# Patient Record
Sex: FEMALE | Race: WHITE | NOT HISPANIC OR LATINO | Employment: OTHER | URBAN - METROPOLITAN AREA
[De-identification: names, ages, dates, MRNs, and addresses within clinical notes are randomized per-mention and may not be internally consistent; named-entity substitution may affect disease eponyms.]

---

## 2023-05-02 ENCOUNTER — TELEPHONE (OUTPATIENT)
Dept: PRIMARY CARE | Facility: CLINIC | Age: 73
End: 2023-05-02
Payer: MEDICARE

## 2023-05-02 DIAGNOSIS — K21.9 GERD WITHOUT ESOPHAGITIS: Primary | ICD-10-CM

## 2023-05-02 DIAGNOSIS — E78.5 HYPERLIPIDEMIA, UNSPECIFIED HYPERLIPIDEMIA TYPE: Primary | ICD-10-CM

## 2023-05-02 RX ORDER — ROSUVASTATIN CALCIUM 20 MG/1
20 TABLET, COATED ORAL DAILY
COMMUNITY
End: 2023-05-02 | Stop reason: SDUPTHER

## 2023-05-02 RX ORDER — ROSUVASTATIN CALCIUM 20 MG/1
20 TABLET, COATED ORAL DAILY
Qty: 90 TABLET | Refills: 3 | Status: SHIPPED | OUTPATIENT
Start: 2023-05-02

## 2023-05-02 RX ORDER — FAMOTIDINE 40 MG/1
40 TABLET, FILM COATED ORAL DAILY
Qty: 90 TABLET | Refills: 1 | Status: SHIPPED | OUTPATIENT
Start: 2023-05-02 | End: 2023-05-04 | Stop reason: SDUPTHER

## 2023-05-02 RX ORDER — FAMOTIDINE 40 MG/1
40 TABLET, FILM COATED ORAL DAILY
COMMUNITY
Start: 2021-07-02 | End: 2023-05-02 | Stop reason: SDUPTHER

## 2023-05-02 NOTE — TELEPHONE ENCOUNTER
[facility] day supply [facility]Ref[facility]  [facility]    -Famotidine 40mg QD

## 2023-05-04 DIAGNOSIS — K21.9 GERD WITHOUT ESOPHAGITIS: ICD-10-CM

## 2023-05-05 RX ORDER — FAMOTIDINE 40 MG/1
40 TABLET, FILM COATED ORAL DAILY
Qty: 90 TABLET | Refills: 1 | Status: SHIPPED | OUTPATIENT
Start: 2023-05-05 | End: 2023-11-08

## 2023-05-07 DIAGNOSIS — Z00.00 HEALTHCARE MAINTENANCE: ICD-10-CM

## 2023-05-08 RX ORDER — MELOXICAM 15 MG/1
TABLET ORAL
Qty: 30 TABLET | Refills: 1 | Status: SHIPPED | OUTPATIENT
Start: 2023-05-08 | End: 2023-07-06

## 2023-06-05 DIAGNOSIS — I10 BENIGN ESSENTIAL HYPERTENSION: ICD-10-CM

## 2023-06-05 RX ORDER — CHLORTHALIDONE 25 MG/1
25 TABLET ORAL DAILY
COMMUNITY
Start: 2021-08-03 | End: 2023-06-05 | Stop reason: SDUPTHER

## 2023-06-05 RX ORDER — CHLORTHALIDONE 25 MG/1
25 TABLET ORAL DAILY
Qty: 90 TABLET | Refills: 3 | Status: SHIPPED | OUTPATIENT
Start: 2023-06-05

## 2023-07-06 DIAGNOSIS — Z00.00 HEALTHCARE MAINTENANCE: ICD-10-CM

## 2023-07-06 RX ORDER — MELOXICAM 15 MG/1
TABLET ORAL
Qty: 30 TABLET | Refills: 3 | Status: SHIPPED | OUTPATIENT
Start: 2023-07-06 | End: 2024-02-08 | Stop reason: SDUPTHER

## 2023-11-08 ENCOUNTER — TELEPHONE (OUTPATIENT)
Dept: PRIMARY CARE | Facility: CLINIC | Age: 73
End: 2023-11-08
Payer: MEDICARE

## 2023-11-08 DIAGNOSIS — K21.9 GERD WITHOUT ESOPHAGITIS: ICD-10-CM

## 2023-11-08 RX ORDER — FAMOTIDINE 40 MG/1
40 TABLET, FILM COATED ORAL DAILY
Qty: 90 TABLET | Refills: 1 | Status: SHIPPED | OUTPATIENT
Start: 2023-11-08

## 2023-12-04 ENCOUNTER — LAB (OUTPATIENT)
Dept: LAB | Facility: LAB | Age: 73
End: 2023-12-04
Payer: MEDICARE

## 2023-12-04 DIAGNOSIS — I10 ESSENTIAL (PRIMARY) HYPERTENSION: Primary | ICD-10-CM

## 2023-12-04 DIAGNOSIS — E55.9 VITAMIN D DEFICIENCY, UNSPECIFIED: ICD-10-CM

## 2023-12-04 DIAGNOSIS — E78.5 HYPERLIPIDEMIA, UNSPECIFIED: ICD-10-CM

## 2023-12-04 LAB
25(OH)D3 SERPL-MCNC: 29 NG/ML (ref 30–100)
ALBUMIN SERPL BCP-MCNC: 4.4 G/DL (ref 3.4–5)
ALP SERPL-CCNC: 38 U/L (ref 33–136)
ALT SERPL W P-5'-P-CCNC: 14 U/L (ref 7–45)
ANION GAP SERPL CALC-SCNC: 9 MMOL/L (ref 10–20)
AST SERPL W P-5'-P-CCNC: 16 U/L (ref 9–39)
BILIRUB SERPL-MCNC: 0.5 MG/DL (ref 0–1.2)
BUN SERPL-MCNC: 27 MG/DL (ref 6–23)
CALCIUM SERPL-MCNC: 9.3 MG/DL (ref 8.6–10.3)
CHLORIDE SERPL-SCNC: 102 MMOL/L (ref 98–107)
CHOLEST SERPL-MCNC: 162 MG/DL (ref 0–199)
CHOLESTEROL/HDL RATIO: 3.7
CO2 SERPL-SCNC: 31 MMOL/L (ref 21–32)
CREAT SERPL-MCNC: 0.89 MG/DL (ref 0.5–1.05)
ERYTHROCYTE [DISTWIDTH] IN BLOOD BY AUTOMATED COUNT: 12.8 % (ref 11.5–14.5)
GFR SERPL CREATININE-BSD FRML MDRD: 69 ML/MIN/1.73M*2
GLUCOSE SERPL-MCNC: 105 MG/DL (ref 74–99)
HCT VFR BLD AUTO: 38.8 % (ref 36–46)
HDLC SERPL-MCNC: 43.6 MG/DL
HGB BLD-MCNC: 12.7 G/DL (ref 12–16)
LDLC SERPL CALC-MCNC: 90 MG/DL
MCH RBC QN AUTO: 31.3 PG (ref 26–34)
MCHC RBC AUTO-ENTMCNC: 32.7 G/DL (ref 32–36)
MCV RBC AUTO: 96 FL (ref 80–100)
NON HDL CHOLESTEROL: 118 MG/DL (ref 0–149)
NRBC BLD-RTO: 0 /100 WBCS (ref 0–0)
PLATELET # BLD AUTO: 162 X10*3/UL (ref 150–450)
POTASSIUM SERPL-SCNC: 3.6 MMOL/L (ref 3.5–5.3)
PROT SERPL-MCNC: 6.6 G/DL (ref 6.4–8.2)
RBC # BLD AUTO: 4.06 X10*6/UL (ref 4–5.2)
SODIUM SERPL-SCNC: 138 MMOL/L (ref 136–145)
TRIGL SERPL-MCNC: 142 MG/DL (ref 0–149)
VLDL: 28 MG/DL (ref 0–40)
WBC # BLD AUTO: 4.2 X10*3/UL (ref 4.4–11.3)

## 2023-12-04 PROCEDURE — 80053 COMPREHEN METABOLIC PANEL: CPT

## 2023-12-04 PROCEDURE — 36415 COLL VENOUS BLD VENIPUNCTURE: CPT

## 2023-12-04 PROCEDURE — 85027 COMPLETE CBC AUTOMATED: CPT

## 2023-12-04 PROCEDURE — 82306 VITAMIN D 25 HYDROXY: CPT

## 2023-12-04 PROCEDURE — 80061 LIPID PANEL: CPT

## 2023-12-13 ENCOUNTER — OFFICE VISIT (OUTPATIENT)
Dept: PRIMARY CARE | Facility: CLINIC | Age: 73
End: 2023-12-13
Payer: MEDICARE

## 2023-12-13 VITALS
DIASTOLIC BLOOD PRESSURE: 72 MMHG | BODY MASS INDEX: 28.63 KG/M2 | WEIGHT: 142 LBS | TEMPERATURE: 96.5 F | HEART RATE: 98 BPM | RESPIRATION RATE: 16 BRPM | HEIGHT: 59 IN | SYSTOLIC BLOOD PRESSURE: 114 MMHG

## 2023-12-13 DIAGNOSIS — C50.912 MALIGNANT NEOPLASM OF LEFT FEMALE BREAST, UNSPECIFIED ESTROGEN RECEPTOR STATUS, UNSPECIFIED SITE OF BREAST (MULTI): ICD-10-CM

## 2023-12-13 DIAGNOSIS — Z00.00 ROUTINE GENERAL MEDICAL EXAMINATION AT HEALTH CARE FACILITY: Primary | ICD-10-CM

## 2023-12-13 DIAGNOSIS — Z12.31 SCREENING MAMMOGRAM, ENCOUNTER FOR: ICD-10-CM

## 2023-12-13 DIAGNOSIS — Z23 IMMUNIZATION DUE: ICD-10-CM

## 2023-12-13 DIAGNOSIS — I10 BENIGN ESSENTIAL HYPERTENSION: ICD-10-CM

## 2023-12-13 PROBLEM — K57.30 DIVERTICULA, COLON: Status: ACTIVE | Noted: 2023-12-13

## 2023-12-13 PROBLEM — E55.9 VITAMIN D DEFICIENCY: Status: ACTIVE | Noted: 2023-12-13

## 2023-12-13 PROBLEM — C50.919 BREAST CANCER (MULTI): Status: ACTIVE | Noted: 2023-12-13

## 2023-12-13 PROBLEM — E78.5 HYPERLIPIDEMIA: Status: ACTIVE | Noted: 2023-12-13

## 2023-12-13 PROCEDURE — 3074F SYST BP LT 130 MM HG: CPT | Performed by: FAMILY MEDICINE

## 2023-12-13 PROCEDURE — 1036F TOBACCO NON-USER: CPT | Performed by: FAMILY MEDICINE

## 2023-12-13 PROCEDURE — 1126F AMNT PAIN NOTED NONE PRSNT: CPT | Performed by: FAMILY MEDICINE

## 2023-12-13 PROCEDURE — 3078F DIAST BP <80 MM HG: CPT | Performed by: FAMILY MEDICINE

## 2023-12-13 PROCEDURE — 1160F RVW MEDS BY RX/DR IN RCRD: CPT | Performed by: FAMILY MEDICINE

## 2023-12-13 PROCEDURE — 90662 IIV NO PRSV INCREASED AG IM: CPT | Performed by: FAMILY MEDICINE

## 2023-12-13 PROCEDURE — G0008 ADMIN INFLUENZA VIRUS VAC: HCPCS | Performed by: FAMILY MEDICINE

## 2023-12-13 PROCEDURE — 1170F FXNL STATUS ASSESSED: CPT | Performed by: FAMILY MEDICINE

## 2023-12-13 PROCEDURE — 1159F MED LIST DOCD IN RCRD: CPT | Performed by: FAMILY MEDICINE

## 2023-12-13 PROCEDURE — G0439 PPPS, SUBSEQ VISIT: HCPCS | Performed by: FAMILY MEDICINE

## 2023-12-13 ASSESSMENT — ACTIVITIES OF DAILY LIVING (ADL)
MANAGING_FINANCES: INDEPENDENT
BATHING: INDEPENDENT
GROCERY_SHOPPING: INDEPENDENT
TAKING_MEDICATION: INDEPENDENT
DRESSING: INDEPENDENT
DOING_HOUSEWORK: INDEPENDENT

## 2023-12-13 ASSESSMENT — PATIENT HEALTH QUESTIONNAIRE - PHQ9
SUM OF ALL RESPONSES TO PHQ9 QUESTIONS 1 AND 2: 0
2. FEELING DOWN, DEPRESSED OR HOPELESS: NOT AT ALL
SUM OF ALL RESPONSES TO PHQ9 QUESTIONS 1 & 2: 0
1. LITTLE INTEREST OR PLEASURE IN DOING THINGS: NOT AT ALL
1. LITTLE INTEREST OR PLEASURE IN DOING THINGS: NOT AT ALL
2. FEELING DOWN, DEPRESSED OR HOPELESS: NOT AT ALL

## 2023-12-13 ASSESSMENT — ENCOUNTER SYMPTOMS
DEPRESSION: 0
LOSS OF SENSATION IN FEET: 0
OCCASIONAL FEELINGS OF UNSTEADINESS: 0

## 2023-12-13 NOTE — PROGRESS NOTES
"Subjective   Reason for Visit: Taylor Canchola is an 73 y.o. female here for a Medicare Wellness visit.     Past Medical, Surgical, and Family History reviewed and updated in chart.    Reviewed all medications by prescribing practitioner or clinical pharmacist (such as prescriptions, OTCs, herbal therapies and supplements) and documented in the medical record.    HPI    Patient Care Team:  Lisa Croft MD as PCP - General  Lisa Croft MD as PCP - [facility]     Review of Systems    Objective   Vitals:  /72   Pulse 98   Temp 35.8 °C (96.5 °F) (Tympanic)   Resp 16   Ht 1.499 m (4' 11\")   Wt 64.4 kg (142 lb)   BMI 28.68 kg/m²       Physical Exam  Vitals and nursing note reviewed.   Constitutional:       General: She is not in acute distress.     Appearance: She is not ill-appearing.   HENT:      Head: Normocephalic and atraumatic.      Mouth/Throat:      Mouth: Mucous membranes are moist.   Eyes:      Conjunctiva/sclera: Conjunctivae normal.   Cardiovascular:      Rate and Rhythm: Normal rate and regular rhythm.      Heart sounds: Normal heart sounds.   Pulmonary:      Effort: Pulmonary effort is normal.      Breath sounds: Normal breath sounds.   Skin:     General: Skin is warm.   Neurological:      Mental Status: She is alert.   Psychiatric:         Mood and Affect: Mood normal.         Thought Content: Thought content normal.         Judgment: Judgment normal.         Assessment/Plan   Problem List Items Addressed This Visit       Breast cancer (CMS/HCC)    Current Assessment & Plan     2014  treated surgery and radiation         Benign essential hypertension     Other Visit Diagnoses       Routine general medical examination at health care facility    -  Primary    Screening mammogram, encounter for        Relevant Orders    BI mammo bilateral screening tomosynthesis    Immunization due        Relevant Orders    Flu vaccine, high dose seasonal, preservative free      "

## 2024-02-08 ENCOUNTER — TELEPHONE (OUTPATIENT)
Dept: PRIMARY CARE | Facility: CLINIC | Age: 74
End: 2024-02-08
Payer: MEDICARE

## 2024-02-08 DIAGNOSIS — Z00.00 HEALTHCARE MAINTENANCE: ICD-10-CM

## 2024-02-08 RX ORDER — MELOXICAM 15 MG/1
15 TABLET ORAL DAILY
Qty: 30 TABLET | Refills: 3 | Status: SHIPPED | OUTPATIENT
Start: 2024-02-08

## 2024-02-08 NOTE — TELEPHONE ENCOUNTER
Patient requests refill   meloxicam (Mobic) 15 mg tablet    Please send to [facility] [place]  [phone]  She is visiting in [place]

## 2024-05-08 ENCOUNTER — OFFICE VISIT (OUTPATIENT)
Dept: PRIMARY CARE | Facility: CLINIC | Age: 74
End: 2024-05-08
Payer: MEDICARE

## 2024-05-08 VITALS
BODY MASS INDEX: 27.98 KG/M2 | TEMPERATURE: 97 F | DIASTOLIC BLOOD PRESSURE: 76 MMHG | SYSTOLIC BLOOD PRESSURE: 116 MMHG | WEIGHT: 138.8 LBS | HEART RATE: 94 BPM | OXYGEN SATURATION: 96 % | HEIGHT: 59 IN | RESPIRATION RATE: 16 BRPM

## 2024-05-08 DIAGNOSIS — C50.912 MALIGNANT NEOPLASM OF LEFT FEMALE BREAST, UNSPECIFIED ESTROGEN RECEPTOR STATUS, UNSPECIFIED SITE OF BREAST (MULTI): ICD-10-CM

## 2024-05-08 DIAGNOSIS — K21.9 GASTROESOPHAGEAL REFLUX DISEASE WITHOUT ESOPHAGITIS: Primary | ICD-10-CM

## 2024-05-08 PROBLEM — E28.39 HYPOESTROGENISM: Status: ACTIVE | Noted: 2024-05-08

## 2024-05-08 PROBLEM — L57.0 ACTINIC KERATOSES: Status: ACTIVE | Noted: 2024-05-08

## 2024-05-08 PROBLEM — N81.4 PROLAPSE, UTEROVAGINAL: Status: ACTIVE | Noted: 2024-05-08

## 2024-05-08 PROBLEM — H04.123 DRY EYE SYNDROME OF BOTH EYES: Status: ACTIVE | Noted: 2023-07-25

## 2024-05-08 PROBLEM — H43.813 VITREOUS DEGENERATION OF BOTH EYES: Status: ACTIVE | Noted: 2023-07-25

## 2024-05-08 PROBLEM — N91.2: Status: ACTIVE | Noted: 2024-05-08

## 2024-05-08 PROBLEM — L81.4 SOLAR LENTIGO: Status: ACTIVE | Noted: 2024-05-08

## 2024-05-08 PROBLEM — R92.8 ABNORMAL MAMMOGRAM: Status: ACTIVE | Noted: 2024-05-08

## 2024-05-08 PROBLEM — R03.0 ELEVATED BLOOD PRESSURE READING: Status: ACTIVE | Noted: 2024-05-08

## 2024-05-08 PROBLEM — H52.4 PRESBYOPIA: Status: ACTIVE | Noted: 2023-07-25

## 2024-05-08 PROBLEM — Z96.1 PSEUDOPHAKIA: Status: ACTIVE | Noted: 2023-07-25

## 2024-05-08 PROBLEM — K64.9 HEMORRHOIDS: Status: ACTIVE | Noted: 2024-05-08

## 2024-05-08 PROBLEM — F41.9 ANXIETY DISORDER: Status: ACTIVE | Noted: 2024-05-08

## 2024-05-08 PROCEDURE — 3078F DIAST BP <80 MM HG: CPT | Performed by: INTERNAL MEDICINE

## 2024-05-08 PROCEDURE — 1036F TOBACCO NON-USER: CPT | Performed by: INTERNAL MEDICINE

## 2024-05-08 PROCEDURE — 3074F SYST BP LT 130 MM HG: CPT | Performed by: INTERNAL MEDICINE

## 2024-05-08 PROCEDURE — 1159F MED LIST DOCD IN RCRD: CPT | Performed by: INTERNAL MEDICINE

## 2024-05-08 PROCEDURE — 99214 OFFICE O/P EST MOD 30 MIN: CPT | Performed by: INTERNAL MEDICINE

## 2024-05-08 PROCEDURE — 1157F ADVNC CARE PLAN IN RCRD: CPT | Performed by: INTERNAL MEDICINE

## 2024-05-08 RX ORDER — VIT C/E/ZN/COPPR/LUTEIN/ZEAXAN 250MG-90MG
CAPSULE ORAL
COMMUNITY

## 2024-05-08 RX ORDER — ANASTROZOLE 1 MG/1
TABLET ORAL
COMMUNITY
End: 2024-05-08 | Stop reason: WASHOUT

## 2024-05-08 RX ORDER — ESOMEPRAZOLE MAGNESIUM 40 MG/1
40 CAPSULE, DELAYED RELEASE ORAL
Qty: 30 CAPSULE | Refills: 1 | Status: SHIPPED | OUTPATIENT
Start: 2024-05-08 | End: 2024-07-07

## 2024-05-08 ASSESSMENT — PATIENT HEALTH QUESTIONNAIRE - PHQ9
1. LITTLE INTEREST OR PLEASURE IN DOING THINGS: NOT AT ALL
SUM OF ALL RESPONSES TO PHQ9 QUESTIONS 1 AND 2: 0
2. FEELING DOWN, DEPRESSED OR HOPELESS: NOT AT ALL

## 2024-05-08 NOTE — PROGRESS NOTES
Subjective   Patient ID: [id number]     Taylor Canchola is a 73 y.o. female who presents for Establish Care.    Current Outpatient Medications:     chlorthalidone (Hygroton) 25 mg tablet, Take 1 tablet (25 mg) by mouth once daily., Disp: 90 tablet, Rfl: 3    cholecalciferol (Vitamin D-3) 25 MCG (1000 UT) capsule, Take by mouth., Disp: , Rfl:     famotidine (Pepcid) 40 mg tablet, TAKE 1 TABLET ONCE DAILY, Disp: 90 tablet, Rfl: 1    meloxicam (Mobic) 15 mg tablet, Take 1 tablet (15 mg) by mouth once daily., Disp: 30 tablet, Rfl: 3    rosuvastatin (Crestor) 20 mg tablet, Take 1 tablet (20 mg) by mouth once daily., Disp: 90 tablet, Rfl: 3    anastrozole (Arimidex) 1 mg tablet, Take by mouth., Disp: , Rfl:   HPI  73-year-old female patient presented to the office today to establish care.  Patient had an annual exam last December.  Patient is known to have history of hypertension hyperlipidemia history of breast cancer and history of acid reflux.  She is currently doing well has no specific complaints.  She denies any chest pain or difficulty breathing.  She takes famotidine daily so we did discuss maybe a trial of a proton pump inhibitor for short period of time to see if that will control her symptoms better.  Review of system was reviewed all normal except what is noted in HPI   Past Medical History:   Diagnosis Date    Anxiety     Arthritis     Cancer (Multi)     Cataract     Cutaneous abscess of groin     Groin abscess    Epidermal cyst     Epidermal cyst    GERD (gastroesophageal reflux disease)     HL (hearing loss)     Hypertension     Iliotibial band syndrome, unspecified leg     Iliotibial band syndrome    Other enthesopathies, not elsewhere classified     Tendinitis of elbow    Pain in left foot     Left foot pain    Pain in leg, unspecified     Musculoskeletal leg pain    Personal history of other diseases of the digestive system 07/16/2019    History of gastroesophageal reflux (GERD)    Personal history of  "other diseases of the musculoskeletal system and connective tissue 07/31/2014    Personal history of arthritis    Plantar fascial fibromatosis     Plantar fasciitis    Sciatica, right side     Sciatica of right side    Strain of muscle, fascia and tendon of lower back, initial encounter     Lumbar strain    Strain of muscle, fascia and tendon of the posterior muscle group at thigh level, right thigh, initial encounter     Right hamstring muscle strain    Trochanteric bursitis, unspecified hip     Trochanteric bursitis      Objective   /76 (BP Location: Left arm, Patient Position: Sitting, BP Cuff Size: Adult)   Pulse 94   Temp 36.1 °C (97 °F)   Resp 16   Ht 1.499 m (4' 11\")   Wt 63 kg (138 lb 12.8 oz)   SpO2 96%   BMI 28.03 kg/m²      Physical Exam  Constitutional:       Appearance: Normal appearance.   Cardiovascular:      Rate and Rhythm: Normal rate and regular rhythm.      Pulses: Normal pulses.      Heart sounds: Normal heart sounds.   Pulmonary:      Effort: Pulmonary effort is normal.      Breath sounds: Normal breath sounds.   Neurological:      Mental Status: She is alert.         Assessment/Plan   Problem List Items Addressed This Visit    None    73-year-old patient with the following issues.    1.  Hypertension, patient's blood pressure is adequate continue with her medications no changes.    2.  Hyperlipidemia on statins the plan is to continue no changes.    3.  Acid reflux we will continue treatment with PPI for 6 to 8 weeks and then patient will go back to H2 blockers and Tums as needed.    Disposition I will see the patient back in the office within the next 6 months for her annual exam and sooner if needed.  Moriah Schroeder MD   "

## 2024-06-03 ENCOUNTER — HOSPITAL ENCOUNTER (OUTPATIENT)
Dept: RADIOLOGY | Facility: HOSPITAL | Age: 74
Discharge: HOME | End: 2024-06-03
Payer: MEDICARE

## 2024-06-03 VITALS — HEIGHT: 59 IN | BODY MASS INDEX: 26.21 KG/M2 | WEIGHT: 130 LBS

## 2024-06-03 DIAGNOSIS — Z12.31 SCREENING MAMMOGRAM, ENCOUNTER FOR: ICD-10-CM

## 2024-06-03 PROCEDURE — 77067 SCR MAMMO BI INCL CAD: CPT

## 2024-06-03 PROCEDURE — 77067 SCR MAMMO BI INCL CAD: CPT | Performed by: STUDENT IN AN ORGANIZED HEALTH CARE EDUCATION/TRAINING PROGRAM

## 2024-06-03 PROCEDURE — 77063 BREAST TOMOSYNTHESIS BI: CPT | Performed by: STUDENT IN AN ORGANIZED HEALTH CARE EDUCATION/TRAINING PROGRAM

## 2024-06-24 DIAGNOSIS — I10 BENIGN ESSENTIAL HYPERTENSION: ICD-10-CM

## 2024-06-25 RX ORDER — CHLORTHALIDONE 25 MG/1
25 TABLET ORAL DAILY
Qty: 90 TABLET | Refills: 3 | Status: SHIPPED | OUTPATIENT
Start: 2024-06-25

## 2024-07-03 DIAGNOSIS — I10 BENIGN ESSENTIAL HYPERTENSION: ICD-10-CM

## 2024-07-03 RX ORDER — CHLORTHALIDONE 25 MG/1
25 TABLET ORAL DAILY
Qty: 7 TABLET | Refills: 0 | Status: SHIPPED | OUTPATIENT
Start: 2024-07-03

## 2024-07-03 NOTE — TELEPHONE ENCOUNTER
Pt is going to run out of this medication before her mail away will be delivered to her. She needs a short supply sent to her local [facility] in [place]. Please advise. Thank you!

## 2024-08-01 ENCOUNTER — APPOINTMENT (OUTPATIENT)
Dept: PRIMARY CARE | Facility: CLINIC | Age: 74
End: 2024-08-01
Payer: MEDICARE

## 2024-08-01 VITALS
WEIGHT: 138 LBS | BODY MASS INDEX: 27.87 KG/M2 | DIASTOLIC BLOOD PRESSURE: 72 MMHG | TEMPERATURE: 97.3 F | SYSTOLIC BLOOD PRESSURE: 116 MMHG | HEART RATE: 93 BPM | OXYGEN SATURATION: 98 %

## 2024-08-01 DIAGNOSIS — K21.9 GASTROESOPHAGEAL REFLUX DISEASE WITHOUT ESOPHAGITIS: Primary | ICD-10-CM

## 2024-08-01 DIAGNOSIS — Z79.899 MEDICATION MANAGEMENT: ICD-10-CM

## 2024-08-01 PROCEDURE — 3074F SYST BP LT 130 MM HG: CPT | Performed by: INTERNAL MEDICINE

## 2024-08-01 PROCEDURE — 1159F MED LIST DOCD IN RCRD: CPT | Performed by: INTERNAL MEDICINE

## 2024-08-01 PROCEDURE — 3078F DIAST BP <80 MM HG: CPT | Performed by: INTERNAL MEDICINE

## 2024-08-01 PROCEDURE — 99214 OFFICE O/P EST MOD 30 MIN: CPT | Performed by: INTERNAL MEDICINE

## 2024-08-01 PROCEDURE — 1157F ADVNC CARE PLAN IN RCRD: CPT | Performed by: INTERNAL MEDICINE

## 2024-08-01 PROCEDURE — 1036F TOBACCO NON-USER: CPT | Performed by: INTERNAL MEDICINE

## 2024-08-01 RX ORDER — MELOXICAM 15 MG/1
15 TABLET ORAL DAILY
Qty: 30 TABLET | Refills: 3 | Status: CANCELLED | OUTPATIENT
Start: 2024-08-01

## 2024-08-01 ASSESSMENT — PATIENT HEALTH QUESTIONNAIRE - PHQ9
SUM OF ALL RESPONSES TO PHQ9 QUESTIONS 1 AND 2: 0
2. FEELING DOWN, DEPRESSED OR HOPELESS: NOT AT ALL
1. LITTLE INTEREST OR PLEASURE IN DOING THINGS: NOT AT ALL

## 2024-08-01 NOTE — PROGRESS NOTES
Subjective   Patient ID: [id number]     Taylor Canchola is a 73 y.o. female who presents for Follow-up (Patient is here for a follow up on medications. ).    Current Outpatient Medications:     chlorthalidone (Hygroton) 25 mg tablet, Take 1 tablet (25 mg) by mouth once daily., Disp: 7 tablet, Rfl: 0    cholecalciferol (Vitamin D-3) 25 MCG (1000 UT) capsule, Take by mouth., Disp: , Rfl:     esomeprazole (NexIUM) 40 mg DR capsule, Take 1 capsule (40 mg) by mouth once daily in the morning. Take before meals. Do not open capsule., Disp: 30 capsule, Rfl: 1    meloxicam (Mobic) 15 mg tablet, Take 1 tablet (15 mg) by mouth once daily., Disp: 30 tablet, Rfl: 3    rosuvastatin (Crestor) 20 mg tablet, Take 1 tablet (20 mg) by mouth once daily., Disp: 90 tablet, Rfl: 3    famotidine (Pepcid) 40 mg tablet, TAKE 1 TABLET ONCE DAILY (Patient not taking: Reported on 8/1/2024), Disp: 90 tablet, Rfl: 1  HPI  73-year-old patient presented to the office today for follow-up visit, last time I saw the patient in my office we started her on Nexium she has been compliant taking it daily with breakfast she had noticed significant improvement.  Her symptoms are 90%.  She is very happy with the outcome, she is going on a cruise in September and wants to continue till then they do a trial off of it and assess her symptoms.no other concerns today .  Review of system was reviewed all normal except what is noted in HPI   Past Medical History:   Diagnosis Date    Anxiety     Arthritis     Breast cancer (Multi)     Cancer (Multi)     Cataract     Cutaneous abscess of groin     Groin abscess    Epidermal cyst     Epidermal cyst    GERD (gastroesophageal reflux disease)     HL (hearing loss)     Hypertension     Iliotibial band syndrome, unspecified leg     Iliotibial band syndrome    Other enthesopathies, not elsewhere classified     Tendinitis of elbow    Pain in left foot     Left foot pain    Pain in leg, unspecified     Musculoskeletal leg pain     Personal history of other diseases of the digestive system 07/16/2019    History of gastroesophageal reflux (GERD)    Personal history of other diseases of the musculoskeletal system and connective tissue 07/31/2014    Personal history of arthritis    Plantar fascial fibromatosis     Plantar fasciitis    Sciatica, right side     Sciatica of right side    Strain of muscle, fascia and tendon of lower back, initial encounter     Lumbar strain    Strain of muscle, fascia and tendon of the posterior muscle group at thigh level, right thigh, initial encounter     Right hamstring muscle strain    Trochanteric bursitis, unspecified hip     Trochanteric bursitis      Objective   /72 (BP Location: Left arm, Patient Position: Sitting, BP Cuff Size: Adult)   Pulse 93   Temp 36.3 °C (97.3 °F) (Temporal)   Wt 62.6 kg (138 lb)   SpO2 98%   BMI 27.87 kg/m²      Physical Exam  Constitutional:       Appearance: Normal appearance.   Cardiovascular:      Rate and Rhythm: Normal rate and regular rhythm.      Pulses: Normal pulses.      Heart sounds: Normal heart sounds.   Pulmonary:      Effort: Pulmonary effort is normal.      Breath sounds: Normal breath sounds.   Neurological:      Mental Status: She is alert.       Assessment/Plan   Problem List Items Addressed This Visit    None  Visit Diagnoses       Healthcare maintenance              73-year-old patient with the following issues.    1.  Acid reflux improved with use of Nexium the plan is to continue with changes patient was given recommendation to be off of anti-inflammatory medication and she stated understanding.    As for other options for her back pain.  She is to take Tylenol as well as she could apply lidocaine patch to the area and use Volaten gel .    All her questions were addressed no other active issues or concerns.  Moriah Schroeder MD

## 2024-08-20 DIAGNOSIS — E78.5 HYPERLIPIDEMIA, UNSPECIFIED HYPERLIPIDEMIA TYPE: ICD-10-CM

## 2024-08-20 RX ORDER — ROSUVASTATIN CALCIUM 20 MG/1
20 TABLET, COATED ORAL DAILY
Qty: 90 TABLET | Refills: 3 | Status: SHIPPED | OUTPATIENT
Start: 2024-08-20

## 2024-11-06 ASSESSMENT — ENCOUNTER SYMPTOMS
PERIANAL NUMBNESS: 0
PARESIS: 0
WEIGHT LOSS: 0
LEG PAIN: 1
PARESTHESIAS: 0
TINGLING: 0
ABDOMINAL PAIN: 0
BOWEL INCONTINENCE: 0
WEAKNESS: 0
BACK PAIN: 1
DYSURIA: 0
FEVER: 0
NUMBNESS: 0
HEADACHES: 0

## 2024-11-08 ENCOUNTER — APPOINTMENT (OUTPATIENT)
Dept: PRIMARY CARE | Facility: CLINIC | Age: 74
End: 2024-11-08
Payer: MEDICARE

## 2024-11-08 VITALS
HEIGHT: 59 IN | SYSTOLIC BLOOD PRESSURE: 130 MMHG | RESPIRATION RATE: 16 BRPM | BODY MASS INDEX: 27.78 KG/M2 | OXYGEN SATURATION: 99 % | DIASTOLIC BLOOD PRESSURE: 60 MMHG | HEART RATE: 110 BPM | WEIGHT: 137.8 LBS | TEMPERATURE: 98 F

## 2024-11-08 DIAGNOSIS — M54.31 BACK PAIN WITH RIGHT-SIDED SCIATICA: Primary | ICD-10-CM

## 2024-11-08 DIAGNOSIS — I10 BENIGN ESSENTIAL HYPERTENSION: ICD-10-CM

## 2024-11-08 DIAGNOSIS — Z23 NEED FOR IMMUNIZATION AGAINST INFLUENZA: ICD-10-CM

## 2024-11-08 DIAGNOSIS — E78.2 MIXED HYPERLIPIDEMIA: ICD-10-CM

## 2024-11-08 RX ORDER — TIZANIDINE 2 MG/1
2 TABLET ORAL EVERY 8 HOURS PRN
Qty: 30 TABLET | Refills: 0 | Status: SHIPPED | OUTPATIENT
Start: 2024-11-08 | End: 2024-11-18

## 2024-11-08 ASSESSMENT — ENCOUNTER SYMPTOMS
SINUS PRESSURE: 0
BLOOD IN STOOL: 0
SORE THROAT: 0
CHEST TIGHTNESS: 0
HEADACHES: 0
BRUISES/BLEEDS EASILY: 0
LIGHT-HEADEDNESS: 0
PERIANAL NUMBNESS: 0
SEIZURES: 0
ENDOCRINE NEGATIVE: 1
GASTROINTESTINAL NEGATIVE: 1
WOUND: 0
EYE REDNESS: 0
COLOR CHANGE: 0
HALLUCINATIONS: 0
ALLERGIC/IMMUNOLOGIC NEGATIVE: 1
CARDIOVASCULAR NEGATIVE: 1
FREQUENCY: 0
FACIAL ASYMMETRY: 0
HEMATOLOGIC/LYMPHATIC NEGATIVE: 1
CONSTIPATION: 0
NEUROLOGICAL NEGATIVE: 1
EYES NEGATIVE: 1
VOMITING: 0
BACK PAIN: 1
EYE DISCHARGE: 0
NAUSEA: 0
UNEXPECTED WEIGHT CHANGE: 0
PALPITATIONS: 0
EYE PAIN: 0
NECK STIFFNESS: 0
DYSURIA: 0
PARESTHESIAS: 0
DIARRHEA: 0
PARESIS: 0
ABDOMINAL PAIN: 0
COUGH: 0
CONSTITUTIONAL NEGATIVE: 1
WEIGHT LOSS: 0
WHEEZING: 0
LEG PAIN: 1
AGITATION: 0
CONFUSION: 0
TREMORS: 0
FEVER: 0
BOWEL INCONTINENCE: 0
POLYDIPSIA: 0
SHORTNESS OF BREATH: 0
APPETITE CHANGE: 0
PSYCHIATRIC NEGATIVE: 1
MYALGIAS: 0
TROUBLE SWALLOWING: 0
POLYPHAGIA: 0
VOICE CHANGE: 0
SINUS PAIN: 0
STRIDOR: 0
NUMBNESS: 0
RESPIRATORY NEGATIVE: 1
TINGLING: 0
SPEECH DIFFICULTY: 0
WEAKNESS: 0
ADENOPATHY: 0
ARTHRALGIAS: 0
FLANK PAIN: 0
NECK PAIN: 0
ACTIVITY CHANGE: 0
SLEEP DISTURBANCE: 0
DIFFICULTY URINATING: 0
JOINT SWELLING: 0
NERVOUS/ANXIOUS: 0
DIZZINESS: 0

## 2024-11-08 NOTE — PROGRESS NOTES
Subjective   Patient ID: Taylor Canchola is a 73 y.o. female who presents for Leg Pain (Pt is here due to Right Leg pain that comes and goes Dr. Aguilera pt .).  Leg Pain   Pertinent negatives include no numbness or tingling.   Back Pain  This is a recurrent problem. The current episode started 1 to 4 weeks ago. The problem occurs daily. The problem has been waxing and waning since onset. The pain is present in the sacro-iliac. The quality of the pain is described as aching and shooting. The pain radiates to the right foot. The pain is at a severity of 7/10. The pain is Worse during the day. The symptoms are aggravated by bending and sitting. Stiffness is present In the morning. Associated symptoms include leg pain. Pertinent negatives include no abdominal pain, bladder incontinence, bowel incontinence, chest pain, dysuria, fever, headaches, numbness, paresis, paresthesias, pelvic pain, perianal numbness, tingling, weakness or weight loss. Risk factors include history of cancer.       This is 72 yo female patient of Dr. Aguilera with h/o HTN, HTD, GERD, breat cancer.    I reviewed patient pertinent history from [facility] records.     Comes today with c/o low back pain radiating to right leg. Denies any recent fall or injury.  Pain started after prolonged physical activities (washing windows).  Denies numbness or tingling sensation, no focal weakness  Denies any new urinary symptoms, no change in bowel habits. Denies fever, chills.      OTC tylenol has not been helping, Ibuprofen helps a little with pain control.    Patient went to [facility] on 10/30, had X-ray done which showed degenerative changes of lumbar spine.  Advised steroid kat.  I reviewed available  records and discharge orders. Discussed patient's condition in details. I reviewed discharge medications, reconciled discharge medications and compared with outpatient medication list. All medications changes were discussed with patient in details. Current medication  list was updated to reflect recent changes.   Her symptoms only slightly improved with steroids treatment.      Will start PT, patient may need pain medicine consult if symptoms not improving.     Patient suffers from HTN, GERD.    We discussed potential side effects of NSAID's including GI (ulcer, bleeding), CVD (MI, stroke) and kidney failure. Advised to limit/avoid NSAID's, use Tylenol and topical treatments instead.     Review of Systems   Constitutional: Negative.  Negative for activity change, appetite change, fever, unexpected weight change and weight loss.   HENT: Negative.  Negative for congestion, ear discharge, ear pain, hearing loss, mouth sores, nosebleeds, sinus pressure, sinus pain, sore throat, trouble swallowing and voice change.    Eyes: Negative.  Negative for pain, discharge, redness and visual disturbance.   Respiratory: Negative.  Negative for cough, chest tightness, shortness of breath, wheezing and stridor.    Cardiovascular: Negative.  Negative for chest pain, palpitations and leg swelling.   Gastrointestinal: Negative.  Negative for abdominal pain, blood in stool, bowel incontinence, constipation, diarrhea, nausea and vomiting.   Endocrine: Negative.  Negative for polydipsia, polyphagia and polyuria.   Genitourinary: Negative.  Negative for bladder incontinence, difficulty urinating, dysuria, flank pain, frequency, pelvic pain and urgency.   Musculoskeletal:  Positive for back pain. Negative for arthralgias, gait problem, joint swelling, myalgias, neck pain and neck stiffness.   Skin: Negative.  Negative for color change, rash and wound.   Allergic/Immunologic: Negative.  Negative for environmental allergies, food allergies and immunocompromised state.   Neurological: Negative.  Negative for dizziness, tingling, tremors, seizures, syncope, facial asymmetry, speech difficulty, weakness, light-headedness, numbness, headaches and paresthesias.   Hematological: Negative.  Negative for  "adenopathy. Does not bruise/bleed easily.   Psychiatric/Behavioral: Negative.  Negative for agitation, behavioral problems, confusion, hallucinations, sleep disturbance and suicidal ideas. The patient is not nervous/anxious.    All other systems reviewed and are negative.      Objective     Review of systems was performed and all systems were negative except what in HPI    /60 (BP Location: Left arm, Patient Position: Sitting, BP Cuff Size: Adult)   Pulse 110   Temp 36.7 °C (98 °F) (Temporal)   Resp 16   Ht 1.499 m (4' 11\")   Wt 62.5 kg (137 lb 12.8 oz)   SpO2 99%   BMI 27.83 kg/m²    Physical Exam  Vitals and nursing note reviewed.   Constitutional:       General: She is not in acute distress.     Appearance: Normal appearance.   HENT:      Head: Normocephalic and atraumatic.      Right Ear: External ear normal.      Left Ear: External ear normal.      Nose: Nose normal. No congestion or rhinorrhea.   Eyes:      General:         Right eye: No discharge.         Left eye: No discharge.      Extraocular Movements: Extraocular movements intact.      Conjunctiva/sclera: Conjunctivae normal.      Pupils: Pupils are equal, round, and reactive to light.   Cardiovascular:      Rate and Rhythm: Normal rate and regular rhythm.      Pulses: Normal pulses.      Heart sounds: Normal heart sounds. No murmur heard.     No friction rub. No gallop.   Pulmonary:      Effort: Pulmonary effort is normal. No respiratory distress.      Breath sounds: Normal breath sounds. No stridor. No wheezing, rhonchi or rales.   Chest:      Chest wall: No tenderness.   Abdominal:      General: Bowel sounds are normal.      Palpations: Abdomen is soft. There is no mass.      Tenderness: There is no abdominal tenderness. There is no guarding or rebound.   Musculoskeletal:         General: No swelling or deformity. Normal range of motion.      Cervical back: Normal range of motion and neck supple. No rigidity or tenderness.      Right " lower leg: No edema.      Left lower leg: No edema.      Comments: Straight leg raising: able to raise both legs up to 80 degrees without back pain   Lymphadenopathy:      Cervical: No cervical adenopathy.   Skin:     General: Skin is warm and dry.      Coloration: Skin is not jaundiced.      Findings: No bruising or erythema.   Neurological:      General: No focal deficit present.      Mental Status: She is alert and oriented to person, place, and time. Mental status is at baseline.      Cranial Nerves: No cranial nerve deficit.      Motor: No weakness.      Coordination: Coordination normal.      Gait: Gait normal.   Psychiatric:         Mood and Affect: Mood normal.         Behavior: Behavior normal.         Thought Content: Thought content normal.         Judgment: Judgment normal.         Assessment/Plan   Problem List Items Addressed This Visit             ICD-10-CM       Cardiac and Vasculature    Hyperlipidemia E78.5     Continue current treatment.         Benign essential hypertension I10     Continue current treatment.          Other Visit Diagnoses         Codes    Back pain with right-sided sciatica    -  Primary M54.31    Relevant Medications    tiZANidine (Zanaflex) 2 mg tablet    Other Relevant Orders    Referral to Physical Therapy    Need for immunization against influenza     Z23    Relevant Orders    Flu vaccine, trivalent, preservative free, HIGH-DOSE, age 65y+ (Fluzone) (Completed)        It was a pleasure to see you today.  I would like to remind you about importance of a healthy lifestyle in order to improve your well-being and live longer.  Try to engage in physical activities for at least 150 minutes per week.  Eat about 10 servings of fruits and vegetables daily. My advice is 2 servings of fruits and 8 servings of vegetables.  For vegetables choose at least half of them green and at least half of them fresh.  Please avoid sugar, salt, fried food and saturated fat.    I spent a total of 32  minutes on the date of service for follow up visit, which included preparing to see the patient, face-to-face patient care, completing clinical documentation, obtaining and/or reviewing separately obtained history, performing a medically appropriate examination, counseling and educating the patient/family/caregiver, ordering medications, tests, or procedures, communicating with other health care providers (not separately reported), independently interpreting results (not separately reported), communicating results to the patient/family/caregiver, and care coordination (not separately reported).    F/up with Dr. Aguilera as scheduled or sooner if needed.

## 2024-11-22 NOTE — PROGRESS NOTES
Physical Therapy  Physical Therapy Evaluation    Patient Name: Taylor Canchola  MRN: [id number]  Today's Date: 11/26/2024  Time Calculation  Start Time: 0831  Stop Time: 0911  Time Calculation (min): 40 min  PT Evaluation Time Entry  PT Evaluation (Low) Time Entry: 17  PT Therapeutic Procedures Time Entry  Therapeutic Exercise Time Entry: 23                 Insurance:  COPAY 0 (MET  Visit number: 1 of 9  Authorization info: NO AUTH REQ   Insurance Type: MEDICARE/ [id number]($0 USED)     General:  Reason for visit: LBP  Referred by: Cynthia Nolen MD PhD     Current Problem:  M54.31ICD-10-CMBack pain with right-sided sciatica     Precautions: Per MR         Medical History Form: Reviewed (scanned into chart)    Subjective:   This is a recurrent problem. The current episode started 1 to 4 weeks ago. The problem occurs daily. The problem has been waxing and waning since onset. The pain is present in the sacro-iliac. The quality of the pain is described as aching and shooting. The pain radiates to the right foot. The pain is at a severity of 7/10. The pain is Worse during the day. The symptoms are aggravated by bending and sitting. Stiffness is present In the morning. Associated symptoms include leg pain. Pertinent negatives include no abdominal pain, bladder incontinence, bowel incontinence, chest pain, dysuria, fever, headaches, numbness, paresis, paresthesias, pelvic pain, perianal numbness, tingling, weakness or weight loss. Risk factors include history of cancer. -Encounter note 11/8/24  Pt states she gets pain from extended sitting and repetitive motions.  She has had this before, but it usually goes away.    Chief Complaint: Patient presents to clinic LBP w/ sciatica  Chronic  ABHILASH: Insidious    Current Condition:   Same    Pain:  Pain Assessment: 0-10  0-10 (Numeric) Pain Score: 4  Pain Location: Back  Pain Orientation: Right  Pain Descriptors: Aching  Pain Frequency: Constant/continuous  Clinical  Progression: Not changed  Aggravating Factors:  Sitting and Bending Forward  Relieving Factors:  Rest    Relevant Information (PMH & Previous Tests/Imaging): 11/31/24  FINDINGS: Multilevel degenerative disc disease and facet   arthrosis present.  There is normal vertebral body alignment   without listhesis. There is no evidence of acute fracture with no   loss of vertebral body height. Visualized soft tissues are   unremarkable.     Previous Interventions/Treatments: None    Prior Level of Function (PLOF)  Patient previously independent with all ADLs  Exercise/Physical Activity: NA  Work/School: Retired    Patients Living Environment: Reviewed and no concern    Primary Language: English    Patient's Goal(s) for Therapy: learn what to do when this happens    Red Flags: Do you have any of the following? No  Fever/chills, unexplained weight changes, dizziness/fainting, unexplained change in bowel or bladder functions, unexplained malaise or muscle weakness, night pain/sweats, numbness or tingling    Objective:  Slump (-)  Leg Lowering (+) Core weakness 3/5  Hip Scouring (-)    LUMBAR AROM  FF 30  EX 25  RSB 20  LSB 20    HIP AROM  R WNL  L WNL      Lower Extremity Functional Movements  Transfers: Ind  Gait: Ind    Outcome Measures:  Other Measures  Oswestry Disablity Index (SATISH): 10      EDUCATION: Pt educated in HEP, PT POC, anatomy, activity avoidance, proper positioning/posture, use of cold/heat , pt demonstrates understanding of PT info, all questions answered.        Goals: Set and discussed today  Increase ROM to WFL of LB  Increase Strength where deficits noted by 1/3 to 1 mm grade   Ind w/ HEP for LB to expedite progress and promote goal achievement for pt.    Improve Outcome score for evidence of improved function of LB.  Return to PLOF   Decrease pain to 2/10 or less      Plan of care was developed with input and agreement by the patient.    Treatment Performed:    Therapeutic Exercise:    8 min  Bridges  "2x10  SLR 3x10 BL  TA Bracing 10x10\"        Access Code: [email or web address]  URL: [email or web address]  Date: 11/26/2024  Prepared by: Yo Dimas PT    Exercises  - Lying Prone  - 2 x daily - 7 x weekly - 1 sets - 1 reps - 2 min hold  - Static Prone on Elbows  - 1 x daily - 7 x weekly - 1 sets - 5 reps - 1m hold  - Prone Push Ups on Forearms  - 2 x daily - 7 x weekly - 1-3 sets - 10 reps  - Supine Active Straight Leg Raise  - 2 x daily - 7 x weekly - 3 sets - 10 reps  - Supine Posterior Pelvic Tilt  - 2 x daily - 7 x weekly - 1 sets - 10-15 reps - 10 hold  - Supine Bridge  - 2 x daily - 7 x weekly - 1-3 sets - 10 reps    Assessment: 74 y/o F presents with c/o LBP. Upon examination patient demonstrates moderate pain limiting overall functional mobility including house chores. Activity limitations and participations restrictions include ADL's. Pt to benefit from outpatient PT to address deficits, maximize functional mobility and improve QOL.  The clinical presentation of this patient is stable and their history and examination findings are consistent with a low complexity evaluation with good rehab potential.            Plan:     Planned Interventions include: therapeutic exercise, self-care home management, manual therapy, therapeutic activities, gait training, neuromuscular coordination, vasopneumatic, dry needling, aquatic therapy. Pt unable to   Frequency: 2x/wk  Duration: 4wks      Yo Dimas PT  "

## 2024-11-26 ENCOUNTER — EVALUATION (OUTPATIENT)
Dept: PHYSICAL THERAPY | Facility: CLINIC | Age: 74
End: 2024-11-26
Payer: MEDICARE

## 2024-11-26 DIAGNOSIS — M54.31 BACK PAIN WITH RIGHT-SIDED SCIATICA: Primary | ICD-10-CM

## 2024-11-26 PROCEDURE — 97110 THERAPEUTIC EXERCISES: CPT | Mod: GP | Performed by: PHYSICAL THERAPIST

## 2024-11-26 PROCEDURE — 97161 PT EVAL LOW COMPLEX 20 MIN: CPT | Mod: GP | Performed by: PHYSICAL THERAPIST

## 2024-11-26 ASSESSMENT — ENCOUNTER SYMPTOMS
DEPRESSION: 0
OCCASIONAL FEELINGS OF UNSTEADINESS: 0
LOSS OF SENSATION IN FEET: 0

## 2024-11-26 ASSESSMENT — PAIN - FUNCTIONAL ASSESSMENT: PAIN_FUNCTIONAL_ASSESSMENT: 0-10

## 2024-11-26 ASSESSMENT — PAIN DESCRIPTION - DESCRIPTORS: DESCRIPTORS: ACHING

## 2024-11-26 ASSESSMENT — PAIN SCALES - GENERAL: PAINLEVEL_OUTOF10: 4

## 2024-12-12 NOTE — PROGRESS NOTES
Physical Therapy    Discharge Summary    Discharge Summary: PT    Discharge Information: Date of discharge 12/13/24    Therapy Summary: Progressed    Discharge Status: Stable     Rehab Discharge Reason: Achieved all and/or the most significant goals(s)      Patient Name: Taylor Canchola  MRN: [id number]  Today's Date: 12/13/2024  Time Calculation  Start Time: 1051  Stop Time: 1117  Time Calculation (min): 26 min     PT Therapeutic Procedures Time Entry  Therapeutic Exercise Time Entry: 26                   Current Problem  1. Back pain with right-sided sciatica  Follow Up In Physical Therapy          Insurance:  COPAY 0 (MET  Visit number: 2 of 9  Authorization info: NO AUTH REQ   Insurance Type: MEDICARE/ [employer] [id number]($0 USED)     Precautions       Subjective   Pt notes she is doing very well.  Pain  Pain Assessment: 0-10  0-10 (Numeric) Pain Score: 0 - No pain  Pain Location: Back  Pain Orientation: Right  Clinical Progression: Gradually improving      Objective   Arrived late  Other Measures  Oswestry Disablity Index (SATISH): 3    LUMBAR AROM  FF 30  EX 25  RSB 20  LSB 20  Discussed exs for future trip and to call MD if needs for PT arise.  Treatments:  Ther Ex: Back  ZAHEER 5'        Exercises  - Lying Prone  - 2 x daily - 7 x weekly - 1 sets - 1 reps - 2 min hold  - Static Prone on Elbows  - 1 x daily - 7 x weekly - 1 sets - 5 reps - 1m hold  - Prone Push Ups on Forearms  - 2 x daily - 7 x weekly - 1-3 sets - 10 reps  - Supine Active Straight Leg Raise  - 2 x daily - 7 x weekly - 3 sets - 10 reps  - Supine Posterior Pelvic Tilt  - 2 x daily - 7 x weekly - 1 sets - 10-15 reps - 10 hold  - Supine Bridge  - 2 x daily - 7 x weekly - 1-3 sets - 10 reps      Manual:     Goals:   Increase ROM to WFL of LB-MET  Ind w/ HEP for LB to expedite progress and promote goal achievement for pt-MET.    Improve Outcome score for evidence of improved function of LB-MET.  Return to PLOF-MET  Decrease pain to 2/10 or  less-MET    Assessment:  Pt achieved all goals and is ready for DC from PT at this time.  Overall demonstrated good gains w/ therapy intervention.     Plan:  Pt to be Dc'd from PT this date and follow up w/ MD PRN or as scheduled.          Yo Dimas, PT

## 2024-12-13 ENCOUNTER — TREATMENT (OUTPATIENT)
Dept: PHYSICAL THERAPY | Facility: CLINIC | Age: 74
End: 2024-12-13
Payer: MEDICARE

## 2024-12-13 DIAGNOSIS — M54.31 BACK PAIN WITH RIGHT-SIDED SCIATICA: ICD-10-CM

## 2024-12-13 PROCEDURE — 97110 THERAPEUTIC EXERCISES: CPT | Mod: GP | Performed by: PHYSICAL THERAPIST

## 2024-12-13 ASSESSMENT — PAIN SCALES - GENERAL: PAINLEVEL_OUTOF10: 0 - NO PAIN

## 2024-12-13 ASSESSMENT — PAIN - FUNCTIONAL ASSESSMENT: PAIN_FUNCTIONAL_ASSESSMENT: 0-10

## 2025-04-29 ENCOUNTER — APPOINTMENT (OUTPATIENT)
Dept: PRIMARY CARE | Facility: CLINIC | Age: 75
End: 2025-04-29
Payer: MEDICARE

## 2025-04-29 VITALS
OXYGEN SATURATION: 94 % | RESPIRATION RATE: 16 BRPM | TEMPERATURE: 97.4 F | WEIGHT: 139.6 LBS | BODY MASS INDEX: 28.14 KG/M2 | HEIGHT: 59 IN | DIASTOLIC BLOOD PRESSURE: 70 MMHG | SYSTOLIC BLOOD PRESSURE: 106 MMHG | HEART RATE: 105 BPM

## 2025-04-29 DIAGNOSIS — Z78.0 POST-MENOPAUSAL: ICD-10-CM

## 2025-04-29 DIAGNOSIS — M54.50 LOW BACK PAIN WITHOUT SCIATICA, UNSPECIFIED BACK PAIN LATERALITY, UNSPECIFIED CHRONICITY: ICD-10-CM

## 2025-04-29 DIAGNOSIS — Z12.11 COLON CANCER SCREENING: ICD-10-CM

## 2025-04-29 DIAGNOSIS — Z12.31 ENCOUNTER FOR SCREENING MAMMOGRAM FOR MALIGNANT NEOPLASM OF BREAST: ICD-10-CM

## 2025-04-29 DIAGNOSIS — I10 HYPERTENSION, UNSPECIFIED TYPE: ICD-10-CM

## 2025-04-29 DIAGNOSIS — Z13.6 SCREENING FOR CARDIOVASCULAR CONDITION: ICD-10-CM

## 2025-04-29 DIAGNOSIS — Z00.00 HEALTH CARE MAINTENANCE: Primary | ICD-10-CM

## 2025-04-29 DIAGNOSIS — E78.5 HYPERLIPIDEMIA, UNSPECIFIED HYPERLIPIDEMIA TYPE: ICD-10-CM

## 2025-04-29 PROCEDURE — G0439 PPPS, SUBSEQ VISIT: HCPCS | Performed by: INTERNAL MEDICINE

## 2025-04-29 PROCEDURE — 3008F BODY MASS INDEX DOCD: CPT | Performed by: INTERNAL MEDICINE

## 2025-04-29 PROCEDURE — 1159F MED LIST DOCD IN RCRD: CPT | Performed by: INTERNAL MEDICINE

## 2025-04-29 PROCEDURE — 1157F ADVNC CARE PLAN IN RCRD: CPT | Performed by: INTERNAL MEDICINE

## 2025-04-29 PROCEDURE — 3074F SYST BP LT 130 MM HG: CPT | Performed by: INTERNAL MEDICINE

## 2025-04-29 PROCEDURE — 3078F DIAST BP <80 MM HG: CPT | Performed by: INTERNAL MEDICINE

## 2025-04-29 PROCEDURE — 1126F AMNT PAIN NOTED NONE PRSNT: CPT | Performed by: INTERNAL MEDICINE

## 2025-04-29 PROCEDURE — 1036F TOBACCO NON-USER: CPT | Performed by: INTERNAL MEDICINE

## 2025-04-29 PROCEDURE — 1170F FXNL STATUS ASSESSED: CPT | Performed by: INTERNAL MEDICINE

## 2025-04-29 RX ORDER — DICLOFENAC SODIUM 10 MG/G
4 GEL TOPICAL 4 TIMES DAILY
Qty: 100 G | Refills: 1 | Status: SHIPPED | OUTPATIENT
Start: 2025-04-29

## 2025-04-29 RX ORDER — NAPROXEN 500 MG/1
500 TABLET ORAL 2 TIMES DAILY PRN
Qty: 60 TABLET | Refills: 0 | Status: SHIPPED | OUTPATIENT
Start: 2025-04-29 | End: 2025-07-28

## 2025-04-29 ASSESSMENT — ACTIVITIES OF DAILY LIVING (ADL)
BATHING: INDEPENDENT
DOING_HOUSEWORK: INDEPENDENT
DRESSING: INDEPENDENT
MANAGING_FINANCES: INDEPENDENT
TAKING_MEDICATION: INDEPENDENT
GROCERY_SHOPPING: INDEPENDENT

## 2025-04-29 ASSESSMENT — PATIENT HEALTH QUESTIONNAIRE - PHQ9
SUM OF ALL RESPONSES TO PHQ9 QUESTIONS 1 AND 2: 0
1. LITTLE INTEREST OR PLEASURE IN DOING THINGS: NOT AT ALL
2. FEELING DOWN, DEPRESSED OR HOPELESS: NOT AT ALL

## 2025-04-29 ASSESSMENT — PAIN SCALES - GENERAL: PAINLEVEL_OUTOF10: 0-NO PAIN

## 2025-04-29 NOTE — PROGRESS NOTES
Annual Medicare Wellness Exam/Comprehensive Problem Focused Follow Up  HPI/CC  Chief Complaint   Patient presents with    Medicare Annual Wellness Visit Subsequent     Reviewed chart for care received since last appointment.    74-year-old patient presented to the office today for her Medicare wellness visit and annual exam.  Patient is known history of hypertension as well as hyperlipidemia and acid reflux, she is doing well denying chest pain and shortness of breath, she denies abdominal pain nausea vomiting changes in the bowel movements or blood in the stool, she denies urinary symptoms.    She has occasional episodes of lower back pain off-and-on she was recently evaluated with lumbar spine x-ray that demonstrated spondylosis she tries to avoid anti-inflammatory medication orally because of her acid reflux and hypertension.  She has done physical therapy and she continues to do the exercises at home.  No radiation of the pain to the lower extremity.  Assessment and Plan:  Problem List Items Addressed This Visit    None  74-year-old patient with the following issues.    1.  Hypertension patient's blood pressure is adequate continue the current medications no changes.    2.  Hyperlipidemia on rosuvastatin lipid profile is pending dose adjustment will take place if necessary.    3.  Acid reflux controlled with the use of esomeprazole the plan is to continue no changes.    4.  Back pain likely musculoskeletal in nature, we did discuss the importance of using naproxen cautiously and sparingly as needed because of her reflux she was instructed to take it with food twice daily for a short period of time, she is not interested in muscle relaxer if the pain is not that significant, definitive physical medicine and rehab was provided.    5.  Healthcare maintenance that she has lab work is requested mammogram is requested colonoscopy is requested bone mineral density is requested.    Medicare wellness was completed with  "this requirement patient was screened for depression and excessive alcohol consumption.    I would see the patient back in the office in 1 year for repeat physical in 6 months for follow-up.    ROS otherwise negative aside from what was mentioned above in HPI.    Vitals  /70 (BP Location: Right arm, Patient Position: Sitting, BP Cuff Size: Adult)   Pulse 105   Temp 36.3 °C (97.4 °F) (Skin)   Resp 16   Ht 1.499 m (4' 11\")   Wt 63.3 kg (139 lb 9.6 oz)   SpO2 94%   BMI 28.20 kg/m²   Body mass index is 28.2 kg/m².  Physical Exam  Gen: Alert, NAD  HEENT:  Unremarkable  Neck:  No MELLY  Respiratory:  Lungs CTAB  Cardiovascular:  Heart RRR  Neuro:  Gross motor and sensory intact  Skin:  No suspicious lesions present  Breast: No masses, or axillary lymphadenopathy      Patient Care Team:  Moriah Schroeder MD as PCP - General (Internal Medicine)  Moriah Schroeder MD as PCP - [facility] Attributed Provider       Health Risk Assessment:  Patient was asked if he/she has any difficulty performing the following activities of daily living:  Preparing nutritious food and eating? No  Grocery shopping? No  Bathing and grooming yourself? No  Getting dressed? No  Using the toilet?No  Using the phone? No  Moving around from place to place (physical ambulation)? No  Doing housework (including laundry) independently? No  Managing finances independently? No  Managing medications independently? No  Doing housework (including laundry) independently? No    Patient was asked if he/she:  Feels safe in current home environment?: Yes  Uses seatbelt? Yes  Sees the dentist regularly? Yes  Exercises regularly: Yes  Suffers from depression, stress, anger, loneliness or social isolation, pain, suicidality? No    Dietary issues discussed: Yes  Cognitive Impairment No  Hearing difficulties: No  Visual Acuity assessed: Yes    What is your self-assessment of overall health status and life satisfaction? Good     5 minutes spent on SDOH " screening:   Specifically Housing, Food Insecurity, Utilities and Transportatin Needs were evaluated   (See Screenings in Rooming section for documentation)  Food Insecurity: Not on file     Housing Stability: Not on file     Transportation Needs: Not on file       Allergies and Medications  Patient has no known allergies.  Current Outpatient Medications   Medication Instructions    chlorthalidone (HYGROTON) 25 mg, oral, Daily    cholecalciferol (Vitamin D-3) 25 MCG (1000 UT) capsule Take by mouth.    esomeprazole (NEXIUM) 40 mg, oral, Daily before breakfast, Do not open capsule.    meloxicam (MOBIC) 15 mg, oral, Daily    rosuvastatin (CRESTOR) 20 mg, oral, Daily    tiZANidine (ZANAFLEX) 2 mg, oral, Every 8 hours PRN       Medications and Supplements  prescribed by me and other practitioners or clinical pharmacist (such as prescriptions, OTC's, herbal therapies and supplements) were reviewed and documented in the medical record.      Active Problem List  Problem List[1]    Comprehensive Medical/Surgical/Social/Family History  Medical History[2]  Surgical History[3]  Social History     Social History Narrative    Not on file         Tobacco/Alcohol/Opioid use, as well as Illicit Drug Use was screened for/reviewed and documented in Social Documentation section of the chart and medication list as appropriate     Depression Screening  Depression screening completed using the PHQ-2 questions, with results documented in the chart/encounter (5 min spent on this).  Patient Health Questionnaire-2 Score: 0 (4/29/2025 11:45 AM)  (See also Rooming/Screening section for documentation, and/or progress note for additional information)    Cardiac Risk Assessment (15 minutes spent on this)  The 10-year ASCVD risk score (Viki PALMA, et al., 2019) is: 10%    Values used to calculate the score:      Age: 74 years      Sex: Female      Is Non- : No      Diabetic: No      Tobacco smoker: No      Systolic Blood  Pressure: 106 mmHg      Is BP treated: No      HDL Cholesterol: 43.6 mg/dL      Total Cholesterol: 162 mg/dL    Cardiovascular risk was discussed and, if needed, lifestyle modifications recommended, including nutritional choices, exercise, and elimination of habits contributing to risk. We agreed on a plan to reduce the current cardiovascular risk based on above discussion as needed.     Aspirin use/disuse was discussed and documented in the Problem List of the medical record (under Cardiac Risk Counseling) after reviewing the updated guidelines below:  Consider low dose Aspirin ( mg) use if the benefit for cardiovascular disease prevention outweighs risk for bleeding complications.   Discussed that in general, low dose ASA should be considered:  In patients WITHOUT prior MI/stroke/PAD (primary prevention):   a. Age <60: Use if 10-year cardiovascular disease risk >20%, with discussion of risks and benefits with patient  b. Age 60-<70: Use if 10-year cardiovascular disease risk >20% and low bleeding (e.g., gastrointestinal) risk, with discussion of risks and benefits with patient  c. Age >=70: Do not use    In patients WITH prior MI/stroke/PAD (secondary prevention):   Generally use unless extremely high bleeding (e.g., gastrointenstinal) risk, with discussion of risks and benefits with patient    Advance Directives Discussion  Advanced Care Planning (including a Living Will, Healthcare POA, as well as specific end of life choices and/or directives), was discussed with the patient and/or surrogate, voluntarily, and details of that discussion documented in the Problem List (under Advanced Directives Discussion) of the medical record.   (16 min spent discussing above)     During the course of the visit the patient was educated and counseled about age appropriate screening and preventive services.   Completed preventive screenings were documented in the chart (see Routine Health Maintenance in Problem List) and  orders were placed for outstanding screenings/procedures as documented in the Assessment and Plan.    Patient Instructions (the written plan) was given to the patient at check out as part of the AVS.             [1]   Patient Active Problem List  Diagnosis    Breast cancer    Hyperlipidemia    Vitamin D deficiency    Benign essential hypertension    Diverticula, colon    Abnormal mammogram    Actinic keratoses    Anxiety disorder    Dry eye syndrome of both eyes    Elevated blood pressure reading    Hemorrhoids    Hypoestrogenism    Menolipsis    Presbyopia    Prolapse, uterovaginal    Pseudophakia    Solar lentigo    Vitreous degeneration of both eyes    Back pain with right-sided sciatica   [2]   Past Medical History:  Diagnosis Date    Anxiety     Arthritis     Breast cancer     Cancer (Multi)     Cataract     Cutaneous abscess of groin     Groin abscess    Epidermal cyst     Epidermal cyst    GERD (gastroesophageal reflux disease)     HL (hearing loss)     Hypertension     Iliotibial band syndrome, unspecified leg     Iliotibial band syndrome    Other enthesopathies, not elsewhere classified     Tendinitis of elbow    Pain in left foot     Left foot pain    Pain in leg, unspecified     Musculoskeletal leg pain    Personal history of other diseases of the digestive system 07/16/2019    History of gastroesophageal reflux (GERD)    Personal history of other diseases of the musculoskeletal system and connective tissue 07/31/2014    Personal history of arthritis    Plantar fascial fibromatosis     Plantar fasciitis    Sciatica, right side     Sciatica of right side    Strain of muscle, fascia and tendon of lower back, initial encounter     Lumbar strain    Strain of muscle, fascia and tendon of the posterior muscle group at thigh level, right thigh, initial encounter     Right hamstring muscle strain    Trochanteric bursitis, unspecified hip     Trochanteric bursitis   [3]   Past Surgical History:  Procedure  Laterality Date    BREAST LUMPECTOMY      BREAST SURGERY      EYE SURGERY      OTHER SURGICAL HISTORY  07/31/2014    Endovenous Ablation Of Incompetent Vein    OTHER SURGICAL HISTORY  07/31/2014    Stab Phlebectomy Of Varicose Veins    TONSILLECTOMY  07/31/2014    Tonsillectomy

## 2025-04-30 ENCOUNTER — TELEPHONE (OUTPATIENT)
Dept: GASTROENTEROLOGY | Facility: CLINIC | Age: 75
End: 2025-04-30
Payer: MEDICARE

## 2025-05-19 ENCOUNTER — APPOINTMENT (OUTPATIENT)
Dept: OBSTETRICS AND GYNECOLOGY | Facility: CLINIC | Age: 75
End: 2025-05-19
Payer: MEDICARE

## 2025-05-20 DIAGNOSIS — R79.89 ELEVATED TSH: Primary | ICD-10-CM

## 2025-05-21 LAB
ALBUMIN SERPL-MCNC: 4.7 G/DL (ref 3.6–5.1)
ALP SERPL-CCNC: 68 U/L (ref 37–153)
ALT SERPL-CCNC: 12 U/L (ref 6–29)
ANION GAP SERPL CALCULATED.4IONS-SCNC: 10 MMOL/L (CALC) (ref 7–17)
AST SERPL-CCNC: 15 U/L (ref 10–35)
BILIRUB SERPL-MCNC: 0.4 MG/DL (ref 0.2–1.2)
BUN SERPL-MCNC: 27 MG/DL (ref 7–25)
CALCIUM SERPL-MCNC: 9.5 MG/DL (ref 8.6–10.4)
CHLORIDE SERPL-SCNC: 101 MMOL/L (ref 98–110)
CHOLEST SERPL-MCNC: 165 MG/DL
CHOLEST/HDLC SERPL: 3.5 (CALC)
CO2 SERPL-SCNC: 30 MMOL/L (ref 20–32)
CREAT SERPL-MCNC: 0.84 MG/DL (ref 0.6–1)
EGFRCR SERPLBLD CKD-EPI 2021: 73 ML/MIN/1.73M2
ERYTHROCYTE [DISTWIDTH] IN BLOOD BY AUTOMATED COUNT: 13 % (ref 11–15)
GLUCOSE SERPL-MCNC: 113 MG/DL (ref 65–99)
HCT VFR BLD AUTO: 39.8 % (ref 35–45)
HDLC SERPL-MCNC: 47 MG/DL
HGB BLD-MCNC: 12.8 G/DL (ref 11.7–15.5)
LDLC SERPL CALC-MCNC: 95 MG/DL (CALC)
MCH RBC QN AUTO: 31.8 PG (ref 27–33)
MCHC RBC AUTO-ENTMCNC: 32.2 G/DL (ref 32–36)
MCV RBC AUTO: 99 FL (ref 80–100)
NONHDLC SERPL-MCNC: 118 MG/DL (CALC)
PLATELET # BLD AUTO: 220 THOUSAND/UL (ref 140–400)
PMV BLD REES-ECKER: 9.7 FL (ref 7.5–12.5)
POTASSIUM SERPL-SCNC: 4.3 MMOL/L (ref 3.5–5.3)
PROT SERPL-MCNC: 7 G/DL (ref 6.1–8.1)
RBC # BLD AUTO: 4.02 MILLION/UL (ref 3.8–5.1)
SODIUM SERPL-SCNC: 141 MMOL/L (ref 135–146)
T4 FREE SERPL-MCNC: 0.8 NG/DL (ref 0.8–1.8)
TRIGL SERPL-MCNC: 125 MG/DL
TSH SERPL-ACNC: 9.42 MIU/L (ref 0.4–4.5)
WBC # BLD AUTO: 4.2 THOUSAND/UL (ref 3.8–10.8)

## 2025-05-22 ENCOUNTER — TELEPHONE (OUTPATIENT)
Dept: PRIMARY CARE | Facility: CLINIC | Age: 75
End: 2025-05-22
Payer: MEDICARE

## 2025-05-22 NOTE — TELEPHONE ENCOUNTER
Spoke with patient regarding following results. Gave patient central scheduling phone number TSH with reflex to Free T4 if abnormal (05/19/2025 09:33)

## 2025-06-09 ENCOUNTER — APPOINTMENT (OUTPATIENT)
Dept: RADIOLOGY | Facility: HOSPITAL | Age: 75
End: 2025-06-09
Payer: MEDICARE

## 2025-06-09 VITALS — WEIGHT: 139.6 LBS | BODY MASS INDEX: 28.14 KG/M2 | HEIGHT: 59 IN

## 2025-06-09 DIAGNOSIS — Z12.31 ENCOUNTER FOR SCREENING MAMMOGRAM FOR MALIGNANT NEOPLASM OF BREAST: ICD-10-CM

## 2025-06-09 PROCEDURE — 77063 BREAST TOMOSYNTHESIS BI: CPT | Performed by: STUDENT IN AN ORGANIZED HEALTH CARE EDUCATION/TRAINING PROGRAM

## 2025-06-09 PROCEDURE — 77067 SCR MAMMO BI INCL CAD: CPT

## 2025-06-09 PROCEDURE — 77067 SCR MAMMO BI INCL CAD: CPT | Performed by: STUDENT IN AN ORGANIZED HEALTH CARE EDUCATION/TRAINING PROGRAM

## 2025-06-15 NOTE — PROGRESS NOTES
Urogynecology  Provider:  Shu Cohen MD  [phone]              ASSESSMENT AND PLAN:   74-year-old woman with stage 3 POP and asymptomatic microscopic hematuria. She has a personal hx of breast cancer about 10 years ago.    Diagnoses:  #1 Stage 3 POP  #2 AMH    Plan:  Stage 3 POP  - POP-Q: Aa: +3, Ba: +4, C: +3, TVL: 10, Ap: +3, Bp: +3, D: +1  - Explained that prolapse won't suddenly worsen severely.  - The prolapse is sizable but stable, and the patient is not significantly bothered by it. She isn't currently sexually active, which influences the decision-making process regarding surgical intervention.  - Discussed the option of colpocleisis, which is minimally invasive and would resolved the prolapse without affecting urinary or bowel function.  - She is considering this option (colpocleisis) and will discuss it with her .  - No immediate surgery is planned; the patient will follow-up PRN if symptoms worsen or if she decides to proceed with surgery.    2. AMH  - Keep an eye on this moving forward. Will discuss cystoscopy if we run into more blood in the future.  - She has a hx of microscopic hematuria, with no significant findings on recent evaluations. The current level of hematuria is low (+1), and given the stability and lack of concerning symptoms, no further workup is planned at this time.    Follow-up with Dr. Cohen in one year or PRN sooner if she decides to pursue surgery or if symptoms change.    Scribe Attestation  By signing my name below, I, Jacob Carranza, attest that this documentation has been prepared under the direction and in the presence of Shu Cohen MD on 06/16/2025 at 1:13 PM.    Agree with above. I Dr. Cohen, personally performed the services described in the documentation which was scribed virtually and confirm it is both complete and accurate.  Shu Cohen MD        Problem List Items Addressed This Visit    None          I spent a total of 45  minutes in face to face and non face to face time.      Shu Cohen MD              HISTORY OF PRESENT ILLNESS:   Self referral as former pt last seen in 2020 6/17/2020 POP-Q: Stage: 3 and patient was standing. Aa: +3. Ba: +4 C: +3 TVL: 10 Ap: +3. Bp: +3. D: +1.      Prolapse Symptoms:  - The pelvic organ prolapse is not worsening, but it's not improving.About the same and not super bothersome.  - She just wants to check in on the prolapse.  - She doesn't experience significant bother from the prolapse, as it doesn't impact daily activities or cause urinary symptoms.  - She is aware of the prolapse but is not self-conscious about it.  - She inquires about potential surgical options and expresses interest in a vaginal closure procedure, understanding it would eliminate the prolapse but preclude future sexual intercourse.  - She is considering discussing the surgical option with her spouse.     Urinary Symptoms:   - She has a hx of hematuria, noted in February and again today.  - She has not undergone a workup for the hematuria.         Past Medical History:       Medical History[1]       Past Surgical History:       Surgical History[2]      Medications:       Prior to Admission medications    Medication Sig Start Date End Date Taking? Authorizing Provider   chlorthalidone (Hygroton) 25 mg tablet Take 1 tablet (25 mg) by mouth once daily. 7/3/24   Janeth Castorena MD   cholecalciferol (Vitamin D-3) 25 MCG (1000 UT) capsule Take by mouth.    Historical Provider, MD   diclofenac sodium (Voltaren) 1 % gel Apply 4.5 inches (4 g) topically 4 times a day. 4/29/25   Moriah Schroeder MD   esomeprazole (NexIUM) 40 mg DR capsule Take 1 capsule (40 mg) by mouth once daily in the morning. Take before meals. Do not open capsule. 5/8/24 8/1/24  Moriah Schroeder MD   naproxen (Naprosyn) 500 mg tablet Take 1 tablet (500 mg) by mouth 2 times a day as needed for mild pain (1 - 3) (pain). 4/29/25 7/28/25  Moriah  MD Alexandre   rosuvastatin (Crestor) 20 mg tablet Take 1 tablet (20 mg) by mouth once daily. 8/20/24   MD ILIR Perea  Review of Systems   Constitutional: Negative.    HENT:  Positive for hearing loss.    Eyes: Negative.    Respiratory: Negative.     Cardiovascular: Negative.    Gastrointestinal: Negative.         Bad digestion, hemorrhoids   Endocrine: Negative.    Genitourinary: Negative.    Musculoskeletal: Negative.    Neurological: Negative.    Psychiatric/Behavioral: Negative.            PHYSICAL EXAM:      There were no vitals taken for this visit.     No LMP recorded. Patient is postmenopausal.      Declines chaperone for physical exam.    PVR= 0 ml US    Well developed, well nourished, in no apparent distress.   Neurologic/Psychiatric:  Awake, Alert and Oriented times 3.  Affect normal. Normal cranial nerves  Pulm: breathing without effort  Sexual maturity: Stuart stage V  Abd exam: soft, non-tender      GENITAL/URINARY:       External Genitalia:  The patient has normal appearing external genitalia, normal skenes and bartholins glands, and a normal hair distribution.  Her vulva is without lesions, erythema or discharge.  It is non-tender with appropriate sensation.     Urethral Meatus:  Size normal, Location normal, Lesions absent, Prolapse absent,      Urethra:  Fullness absent, Masses absent,      Bladder:  Fullness absent, Masses absent, Tenderness absent,      Vagina:  General appearance normal, Estrogen effect normal, Discharge absent, Lesions absent     Cervix: Normal, no discharge.     Stress urinary incontinence not demonstrable.       POP-Q  The patient has Stage 3 Prolapse.    POP-Q:  Stage: 3  Position: standing    Aa: +3       Ba: +4 C: +3   Gh:  Pb:  TVL: 10         Ap: +3 Bp: +3 D: +1               Shu Cohen MD         [1]   Past Medical History:  Diagnosis Date    Anxiety     Arthritis     Breast cancer     Cancer (Multi)     Cataract     Cutaneous abscess  of groin     Groin abscess    Epidermal cyst     Epidermal cyst    GERD (gastroesophageal reflux disease)     HL (hearing loss)     Hypertension     Iliotibial band syndrome, unspecified leg     Iliotibial band syndrome    Other enthesopathies, not elsewhere classified     Tendinitis of elbow    Pain in left foot     Left foot pain    Pain in leg, unspecified     Musculoskeletal leg pain    Personal history of irradiation 2015    Personal history of other diseases of the digestive system 07/16/2019    History of gastroesophageal reflux (GERD)    Personal history of other diseases of the musculoskeletal system and connective tissue 07/31/2014    Personal history of arthritis    Plantar fascial fibromatosis     Plantar fasciitis    Sciatica, right side     Sciatica of right side    Strain of muscle, fascia and tendon of lower back, initial encounter     Lumbar strain    Strain of muscle, fascia and tendon of the posterior muscle group at thigh level, right thigh, initial encounter     Right hamstring muscle strain    Trochanteric bursitis, unspecified hip     Trochanteric bursitis   [2]   Past Surgical History:  Procedure Laterality Date    BREAST BIOPSY  2014    BREAST LUMPECTOMY      BREAST SURGERY      EYE SURGERY      OTHER SURGICAL HISTORY  07/31/2014    Endovenous Ablation Of Incompetent Vein    OTHER SURGICAL HISTORY  07/31/2014    Stab Phlebectomy Of Varicose Veins    TONSILLECTOMY  07/31/2014    Tonsillectomy

## 2025-06-16 ENCOUNTER — OFFICE VISIT (OUTPATIENT)
Dept: OBSTETRICS AND GYNECOLOGY | Facility: CLINIC | Age: 75
End: 2025-06-16
Payer: MEDICARE

## 2025-06-16 VITALS
BODY MASS INDEX: 28.14 KG/M2 | WEIGHT: 139.6 LBS | HEART RATE: 101 BPM | HEIGHT: 59 IN | SYSTOLIC BLOOD PRESSURE: 114 MMHG | DIASTOLIC BLOOD PRESSURE: 71 MMHG

## 2025-06-16 DIAGNOSIS — R31.9 HEMATURIA, UNSPECIFIED TYPE: ICD-10-CM

## 2025-06-16 DIAGNOSIS — M62.89 PELVIC FLOOR DYSFUNCTION: Primary | ICD-10-CM

## 2025-06-16 LAB
POC APPEARANCE, URINE: CLEAR
POC BILIRUBIN, URINE: NEGATIVE
POC BLOOD, URINE: ABNORMAL
POC COLOR, URINE: YELLOW
POC GLUCOSE, URINE: NEGATIVE MG/DL
POC KETONES, URINE: NEGATIVE MG/DL
POC LEUKOCYTES, URINE: ABNORMAL
POC NITRITE,URINE: NEGATIVE
POC PH, URINE: 7 PH
POC PROTEIN, URINE: NEGATIVE MG/DL
POC SPECIFIC GRAVITY, URINE: 1.01
POC UROBILINOGEN, URINE: 0.2 EU/DL

## 2025-06-16 PROCEDURE — 51798 US URINE CAPACITY MEASURE: CPT | Performed by: OBSTETRICS & GYNECOLOGY

## 2025-06-16 PROCEDURE — 1159F MED LIST DOCD IN RCRD: CPT | Performed by: OBSTETRICS & GYNECOLOGY

## 2025-06-16 PROCEDURE — 3078F DIAST BP <80 MM HG: CPT | Performed by: OBSTETRICS & GYNECOLOGY

## 2025-06-16 PROCEDURE — 3074F SYST BP LT 130 MM HG: CPT | Performed by: OBSTETRICS & GYNECOLOGY

## 2025-06-16 PROCEDURE — 1036F TOBACCO NON-USER: CPT | Performed by: OBSTETRICS & GYNECOLOGY

## 2025-06-16 PROCEDURE — 1126F AMNT PAIN NOTED NONE PRSNT: CPT | Performed by: OBSTETRICS & GYNECOLOGY

## 2025-06-16 PROCEDURE — 99204 OFFICE O/P NEW MOD 45 MIN: CPT | Performed by: OBSTETRICS & GYNECOLOGY

## 2025-06-16 PROCEDURE — 99214 OFFICE O/P EST MOD 30 MIN: CPT | Mod: 25 | Performed by: OBSTETRICS & GYNECOLOGY

## 2025-06-16 PROCEDURE — 81003 URINALYSIS AUTO W/O SCOPE: CPT | Mod: QW | Performed by: OBSTETRICS & GYNECOLOGY

## 2025-06-16 PROCEDURE — 3008F BODY MASS INDEX DOCD: CPT | Performed by: OBSTETRICS & GYNECOLOGY

## 2025-06-16 ASSESSMENT — ENCOUNTER SYMPTOMS
LOSS OF SENSATION IN FEET: 0
CARDIOVASCULAR NEGATIVE: 1
DEPRESSION: 0
EYES NEGATIVE: 1
ENDOCRINE NEGATIVE: 1
GASTROINTESTINAL NEGATIVE: 1
MUSCULOSKELETAL NEGATIVE: 1
CONSTITUTIONAL NEGATIVE: 1
RESPIRATORY NEGATIVE: 1
PSYCHIATRIC NEGATIVE: 1
NEUROLOGICAL NEGATIVE: 1
OCCASIONAL FEELINGS OF UNSTEADINESS: 0

## 2025-06-16 ASSESSMENT — PAIN SCALES - GENERAL: PAINLEVEL_OUTOF10: 0-NO PAIN

## 2025-06-18 ENCOUNTER — TELEPHONE (OUTPATIENT)
Dept: OBSTETRICS AND GYNECOLOGY | Facility: CLINIC | Age: 75
End: 2025-06-18
Payer: MEDICARE

## 2025-06-18 LAB — BACTERIA UR CULT: ABNORMAL

## 2025-06-18 NOTE — TELEPHONE ENCOUNTER
----- Message from Shu Cohen sent at 6/18/2025  2:49 PM EDT -----  Is she symptomatic?  ----- Message -----  From: Adeola Hayes MA  Sent: 6/16/2025  11:42 AM EDT  To: Shu Cohen MD

## 2025-06-23 DIAGNOSIS — I10 BENIGN ESSENTIAL HYPERTENSION: ICD-10-CM

## 2025-06-23 RX ORDER — CHLORTHALIDONE 25 MG/1
25 TABLET ORAL DAILY
Qty: 7 TABLET | Refills: 0 | Status: SHIPPED | OUTPATIENT
Start: 2025-06-23 | End: 2025-06-26 | Stop reason: SDUPTHER

## 2025-06-26 DIAGNOSIS — I10 BENIGN ESSENTIAL HYPERTENSION: ICD-10-CM

## 2025-06-26 RX ORDER — CHLORTHALIDONE 25 MG/1
25 TABLET ORAL DAILY
Qty: 90 TABLET | Refills: 3 | Status: SHIPPED | OUTPATIENT
Start: 2025-06-26

## 2025-08-30 ENCOUNTER — OFFICE VISIT (OUTPATIENT)
Dept: URGENT CARE | Age: 75
End: 2025-08-30
Payer: MEDICARE

## 2025-08-30 VITALS
SYSTOLIC BLOOD PRESSURE: 110 MMHG | OXYGEN SATURATION: 97 % | TEMPERATURE: 97.5 F | RESPIRATION RATE: 19 BRPM | HEART RATE: 83 BPM | DIASTOLIC BLOOD PRESSURE: 71 MMHG

## 2025-08-30 DIAGNOSIS — R30.0 DYSURIA: ICD-10-CM

## 2025-08-30 DIAGNOSIS — N30.91 CYSTITIS WITH HEMATURIA: Primary | ICD-10-CM

## 2025-08-30 LAB
POC APPEARANCE, URINE: CLEAR
POC BILIRUBIN, URINE: NEGATIVE
POC BLOOD, URINE: ABNORMAL
POC COLOR, URINE: YELLOW
POC GLUCOSE, URINE: NEGATIVE MG/DL
POC KETONES, URINE: NEGATIVE MG/DL
POC LEUKOCYTES, URINE: ABNORMAL
POC NITRITE,URINE: NEGATIVE
POC PH, URINE: 6 PH
POC PROTEIN, URINE: NEGATIVE MG/DL
POC SPECIFIC GRAVITY, URINE: 1.01

## 2025-08-30 RX ORDER — NITROFURANTOIN 25; 75 MG/1; MG/1
100 CAPSULE ORAL 2 TIMES DAILY
Qty: 10 CAPSULE | Refills: 0 | Status: SHIPPED | OUTPATIENT
Start: 2025-08-30 | End: 2025-09-04

## 2025-09-01 LAB — BACTERIA UR CULT: NORMAL

## 2025-11-04 ENCOUNTER — APPOINTMENT (OUTPATIENT)
Dept: PRIMARY CARE | Facility: CLINIC | Age: 75
End: 2025-11-04
Payer: MEDICARE

## 2026-05-05 ENCOUNTER — APPOINTMENT (OUTPATIENT)
Dept: PRIMARY CARE | Facility: CLINIC | Age: 76
End: 2026-05-05
Payer: MEDICARE

## 2026-05-12 ENCOUNTER — APPOINTMENT (OUTPATIENT)
Dept: PRIMARY CARE | Facility: CLINIC | Age: 76
End: 2026-05-12
Payer: MEDICARE